# Patient Record
Sex: MALE | Race: WHITE | NOT HISPANIC OR LATINO | Employment: UNEMPLOYED | URBAN - METROPOLITAN AREA
[De-identification: names, ages, dates, MRNs, and addresses within clinical notes are randomized per-mention and may not be internally consistent; named-entity substitution may affect disease eponyms.]

---

## 2017-09-21 ENCOUNTER — HOSPITAL ENCOUNTER (INPATIENT)
Facility: HOSPITAL | Age: 57
LOS: 1 days | Discharge: HOME | DRG: 351 | End: 2017-09-23
Attending: EMERGENCY MEDICINE | Admitting: INTERNAL MEDICINE
Payer: MEDICARE

## 2017-09-21 DIAGNOSIS — M62.82 NON-TRAUMATIC RHABDOMYOLYSIS: ICD-10-CM

## 2017-09-21 DIAGNOSIS — R74.8 ELEVATED CK: Primary | ICD-10-CM

## 2017-09-21 DIAGNOSIS — R10.9 ABDOMINAL DISCOMFORT: ICD-10-CM

## 2017-09-21 DIAGNOSIS — N17.9 AKI (ACUTE KIDNEY INJURY) (HCC): ICD-10-CM

## 2017-09-21 DIAGNOSIS — R11.0 NAUSEA: ICD-10-CM

## 2017-09-21 LAB
BASOPHILS # BLD AUTO: 0.04 THOUSANDS/ΜL (ref 0–0.1)
BASOPHILS NFR BLD AUTO: 0 % (ref 0–1)
EOSINOPHIL # BLD AUTO: 0.05 THOUSAND/ΜL (ref 0–0.61)
EOSINOPHIL NFR BLD AUTO: 0 % (ref 0–6)
ERYTHROCYTE [DISTWIDTH] IN BLOOD BY AUTOMATED COUNT: 12.7 % (ref 11.6–15.1)
HCT VFR BLD AUTO: 46.2 % (ref 36.5–49.3)
HGB BLD-MCNC: 16.2 G/DL (ref 12–17)
LYMPHOCYTES # BLD AUTO: 2.64 THOUSANDS/ΜL (ref 0.6–4.47)
LYMPHOCYTES NFR BLD AUTO: 18 % (ref 14–44)
MCH RBC QN AUTO: 32.3 PG (ref 26.8–34.3)
MCHC RBC AUTO-ENTMCNC: 35.1 G/DL (ref 31.4–37.4)
MCV RBC AUTO: 92 FL (ref 82–98)
MONOCYTES # BLD AUTO: 1.45 THOUSAND/ΜL (ref 0.17–1.22)
MONOCYTES NFR BLD AUTO: 10 % (ref 4–12)
NEUTROPHILS # BLD AUTO: 10.58 THOUSANDS/ΜL (ref 1.85–7.62)
NEUTS SEG NFR BLD AUTO: 72 % (ref 43–75)
NRBC BLD AUTO-RTO: 0 /100 WBCS
PLATELET # BLD AUTO: 263 THOUSANDS/UL (ref 149–390)
PMV BLD AUTO: 11.1 FL (ref 8.9–12.7)
RBC # BLD AUTO: 5.02 MILLION/UL (ref 3.88–5.62)
TSH SERPL DL<=0.05 MIU/L-ACNC: 1.29 UIU/ML (ref 0.36–3.74)
WBC # BLD AUTO: 14.81 THOUSAND/UL (ref 4.31–10.16)

## 2017-09-21 PROCEDURE — 84443 ASSAY THYROID STIM HORMONE: CPT | Performed by: EMERGENCY MEDICINE

## 2017-09-21 PROCEDURE — 96361 HYDRATE IV INFUSION ADD-ON: CPT

## 2017-09-21 PROCEDURE — 81002 URINALYSIS NONAUTO W/O SCOPE: CPT | Performed by: EMERGENCY MEDICINE

## 2017-09-21 PROCEDURE — 85025 COMPLETE CBC W/AUTO DIFF WBC: CPT | Performed by: EMERGENCY MEDICINE

## 2017-09-21 PROCEDURE — 96374 THER/PROPH/DIAG INJ IV PUSH: CPT

## 2017-09-21 PROCEDURE — 82553 CREATINE MB FRACTION: CPT | Performed by: EMERGENCY MEDICINE

## 2017-09-21 PROCEDURE — 36415 COLL VENOUS BLD VENIPUNCTURE: CPT | Performed by: EMERGENCY MEDICINE

## 2017-09-21 PROCEDURE — 80053 COMPREHEN METABOLIC PANEL: CPT | Performed by: EMERGENCY MEDICINE

## 2017-09-21 PROCEDURE — 82550 ASSAY OF CK (CPK): CPT | Performed by: EMERGENCY MEDICINE

## 2017-09-21 RX ORDER — ONDANSETRON 2 MG/ML
4 INJECTION INTRAMUSCULAR; INTRAVENOUS ONCE
Status: COMPLETED | OUTPATIENT
Start: 2017-09-21 | End: 2017-09-21

## 2017-09-21 RX ADMIN — SODIUM CHLORIDE 1000 ML: 0.9 INJECTION, SOLUTION INTRAVENOUS at 22:35

## 2017-09-21 RX ADMIN — ONDANSETRON 4 MG: 2 INJECTION INTRAMUSCULAR; INTRAVENOUS at 23:22

## 2017-09-22 PROBLEM — N17.9 AKI (ACUTE KIDNEY INJURY) (HCC): Status: ACTIVE | Noted: 2017-09-22

## 2017-09-22 PROBLEM — M62.82 NON-TRAUMATIC RHABDOMYOLYSIS: Status: ACTIVE | Noted: 2017-09-22

## 2017-09-22 LAB
ALBUMIN SERPL BCP-MCNC: 4.8 G/DL (ref 3.5–5)
ALP SERPL-CCNC: 103 U/L (ref 46–116)
ALT SERPL W P-5'-P-CCNC: 59 U/L (ref 12–78)
ANION GAP SERPL CALCULATED.3IONS-SCNC: 12 MMOL/L (ref 4–13)
ANION GAP SERPL CALCULATED.3IONS-SCNC: 9 MMOL/L (ref 4–13)
AST SERPL W P-5'-P-CCNC: 83 U/L (ref 5–45)
ATRIAL RATE: 76 BPM
BACTERIA UR QL AUTO: ABNORMAL /HPF
BILIRUB SERPL-MCNC: 0.78 MG/DL (ref 0.2–1)
BILIRUB UR QL STRIP: NEGATIVE
BUN SERPL-MCNC: 30 MG/DL (ref 5–25)
BUN SERPL-MCNC: 34 MG/DL (ref 5–25)
CALCIUM SERPL-MCNC: 8.3 MG/DL (ref 8.3–10.1)
CALCIUM SERPL-MCNC: 9.9 MG/DL (ref 8.3–10.1)
CHLORIDE SERPL-SCNC: 106 MMOL/L (ref 100–108)
CHLORIDE SERPL-SCNC: 97 MMOL/L (ref 100–108)
CK MB SERPL-MCNC: 1 % (ref 0–2.5)
CK MB SERPL-MCNC: 29.2 NG/ML (ref 0–5)
CK MB SERPL-MCNC: 32.1 NG/ML (ref 0–5)
CK MB SERPL-MCNC: <1 % (ref 0–2.5)
CK SERPL-CCNC: 3097 U/L (ref 39–308)
CK SERPL-CCNC: 3335 U/L (ref 39–308)
CLARITY UR: ABNORMAL
CO2 SERPL-SCNC: 23 MMOL/L (ref 21–32)
CO2 SERPL-SCNC: 24 MMOL/L (ref 21–32)
COLOR UR: ABNORMAL
COLOR, POC: YELLOW
CREAT SERPL-MCNC: 1.65 MG/DL (ref 0.6–1.3)
CREAT SERPL-MCNC: 1.98 MG/DL (ref 0.6–1.3)
ERYTHROCYTE [DISTWIDTH] IN BLOOD BY AUTOMATED COUNT: 12.9 % (ref 11.6–15.1)
GFR SERPL CREATININE-BSD FRML MDRD: 37 ML/MIN/1.73SQ M
GFR SERPL CREATININE-BSD FRML MDRD: 46 ML/MIN/1.73SQ M
GLUCOSE SERPL-MCNC: 115 MG/DL (ref 65–140)
GLUCOSE SERPL-MCNC: 115 MG/DL (ref 65–140)
GLUCOSE UR STRIP-MCNC: NEGATIVE MG/DL
HCT VFR BLD AUTO: 41.9 % (ref 36.5–49.3)
HGB BLD-MCNC: 14.7 G/DL (ref 12–17)
HGB UR QL STRIP.AUTO: ABNORMAL
HYALINE CASTS #/AREA URNS LPF: ABNORMAL /LPF
KETONES UR STRIP-MCNC: NEGATIVE MG/DL
LEUKOCYTE ESTERASE UR QL STRIP: NEGATIVE
MAGNESIUM SERPL-MCNC: 2.8 MG/DL (ref 1.6–2.6)
MCH RBC QN AUTO: 32.4 PG (ref 26.8–34.3)
MCHC RBC AUTO-ENTMCNC: 35.1 G/DL (ref 31.4–37.4)
MCV RBC AUTO: 92 FL (ref 82–98)
NITRITE UR QL STRIP: NEGATIVE
NON-SQ EPI CELLS URNS QL MICRO: ABNORMAL /HPF
P AXIS: 65 DEGREES
PH UR STRIP.AUTO: 5.5 [PH] (ref 4.5–8)
PLATELET # BLD AUTO: 231 THOUSANDS/UL (ref 149–390)
PMV BLD AUTO: 10.8 FL (ref 8.9–12.7)
POTASSIUM SERPL-SCNC: 3.6 MMOL/L (ref 3.5–5.3)
POTASSIUM SERPL-SCNC: 3.6 MMOL/L (ref 3.5–5.3)
PR INTERVAL: 160 MS
PROT SERPL-MCNC: 9.2 G/DL (ref 6.4–8.2)
PROT UR STRIP-MCNC: ABNORMAL MG/DL
QRS AXIS: 86 DEGREES
QRSD INTERVAL: 94 MS
QT INTERVAL: 374 MS
QTC INTERVAL: 420 MS
RBC # BLD AUTO: 4.54 MILLION/UL (ref 3.88–5.62)
RBC #/AREA URNS AUTO: ABNORMAL /HPF
SODIUM SERPL-SCNC: 132 MMOL/L (ref 136–145)
SODIUM SERPL-SCNC: 139 MMOL/L (ref 136–145)
SP GR UR STRIP.AUTO: 1.02 (ref 1–1.03)
SPECIMEN SOURCE: NORMAL
T WAVE AXIS: 68 DEGREES
TROPONIN I BLD-MCNC: 0.01 NG/ML (ref 0–0.08)
UROBILINOGEN UR QL STRIP.AUTO: 0.2 E.U./DL
VENTRICULAR RATE: 76 BPM
WBC # BLD AUTO: 11.38 THOUSAND/UL (ref 4.31–10.16)
WBC #/AREA URNS AUTO: ABNORMAL /HPF

## 2017-09-22 PROCEDURE — 85027 COMPLETE CBC AUTOMATED: CPT | Performed by: INTERNAL MEDICINE

## 2017-09-22 PROCEDURE — 80048 BASIC METABOLIC PNL TOTAL CA: CPT | Performed by: INTERNAL MEDICINE

## 2017-09-22 PROCEDURE — 36415 COLL VENOUS BLD VENIPUNCTURE: CPT | Performed by: INTERNAL MEDICINE

## 2017-09-22 PROCEDURE — 82550 ASSAY OF CK (CPK): CPT | Performed by: INTERNAL MEDICINE

## 2017-09-22 PROCEDURE — 96361 HYDRATE IV INFUSION ADD-ON: CPT

## 2017-09-22 PROCEDURE — 93005 ELECTROCARDIOGRAM TRACING: CPT | Performed by: EMERGENCY MEDICINE

## 2017-09-22 PROCEDURE — 83735 ASSAY OF MAGNESIUM: CPT | Performed by: INTERNAL MEDICINE

## 2017-09-22 PROCEDURE — 82553 CREATINE MB FRACTION: CPT | Performed by: INTERNAL MEDICINE

## 2017-09-22 PROCEDURE — 99285 EMERGENCY DEPT VISIT HI MDM: CPT

## 2017-09-22 PROCEDURE — 84484 ASSAY OF TROPONIN QUANT: CPT

## 2017-09-22 PROCEDURE — 81001 URINALYSIS AUTO W/SCOPE: CPT

## 2017-09-22 RX ORDER — OXYCODONE HYDROCHLORIDE 5 MG/1
2.5 TABLET ORAL EVERY 4 HOURS PRN
Status: DISCONTINUED | OUTPATIENT
Start: 2017-09-22 | End: 2017-09-23 | Stop reason: HOSPADM

## 2017-09-22 RX ORDER — ALBUTEROL SULFATE 90 UG/1
2 AEROSOL, METERED RESPIRATORY (INHALATION) EVERY 4 HOURS PRN
Status: DISCONTINUED | OUTPATIENT
Start: 2017-09-22 | End: 2017-09-23 | Stop reason: HOSPADM

## 2017-09-22 RX ORDER — NICOTINE 21 MG/24HR
14 PATCH, TRANSDERMAL 24 HOURS TRANSDERMAL DAILY
Status: DISCONTINUED | OUTPATIENT
Start: 2017-09-22 | End: 2017-09-23 | Stop reason: HOSPADM

## 2017-09-22 RX ORDER — ACETAMINOPHEN 325 MG/1
650 TABLET ORAL EVERY 6 HOURS PRN
Status: DISCONTINUED | OUTPATIENT
Start: 2017-09-22 | End: 2017-09-23 | Stop reason: HOSPADM

## 2017-09-22 RX ORDER — SODIUM CHLORIDE 9 MG/ML
125 INJECTION, SOLUTION INTRAVENOUS CONTINUOUS
Status: DISCONTINUED | OUTPATIENT
Start: 2017-09-22 | End: 2017-09-22

## 2017-09-22 RX ORDER — SODIUM CHLORIDE 9 MG/ML
125 INJECTION, SOLUTION INTRAVENOUS CONTINUOUS
Status: DISPENSED | OUTPATIENT
Start: 2017-09-22 | End: 2017-09-23

## 2017-09-22 RX ADMIN — SODIUM CHLORIDE 125 ML/HR: 0.9 INJECTION, SOLUTION INTRAVENOUS at 10:12

## 2017-09-22 RX ADMIN — SODIUM CHLORIDE 125 ML/HR: 0.9 INJECTION, SOLUTION INTRAVENOUS at 23:25

## 2017-09-22 RX ADMIN — SODIUM CHLORIDE 1000 ML: 0.9 INJECTION, SOLUTION INTRAVENOUS at 00:12

## 2017-09-22 RX ADMIN — SODIUM CHLORIDE 125 ML/HR: 0.9 INJECTION, SOLUTION INTRAVENOUS at 07:20

## 2017-09-22 RX ADMIN — SODIUM CHLORIDE 125 ML/HR: 0.9 INJECTION, SOLUTION INTRAVENOUS at 03:50

## 2017-09-22 NOTE — PROGRESS NOTES
Post midnight follow up:     Elevated CK, with rhabdomyolysis from and over exertion while cutting tree:  - CK 3335 with  -mild increased from admission continue IV fluids for now  - troponin x1 0 01  - elevated creatinine 1 65 improvement from admission 1 98  - continue IV fluids  - WBC count improved from admission 11 38  - patient afebrile on to temperatures obtained since admission, blood pressure is stable+    AKA:  - related to rhabdo  - no history of prior baseline  - avoid nephrotoxins medications  - improving with IV fluid hydration  - continue IV fluids    Leukocytosis:  - improving since admission  - likely secondary to dehydration and rhabdo    Chest pain:  Transient  - troponin x1 negative  - EKG stable  - continue to monitor    Chronic diarrhea:  - patient reports chronic loose bowel movements for about 2 years  - should follow up with GI    GERD:   -  Patient does report significant history of reflux  - PPI initiated  - again outpatient GI follow-up    Nicotine abuse:   - patch  - pt reports slowing down but with cough and rhonchi as well as wheezing   - resp protocol     Subjective:  Patient reports slight improvement and severe leg cramping and hand cramping   - he still has pain and discomfort in all of his muscles  - he is with chronic diarrhea which I have instructed him to follow up with colonoscopy and outpatient follow-up with GI  - plan for potential discharge tomorrow after more 24 hours of IV fluids and trend down in CK

## 2017-09-22 NOTE — CASE MANAGEMENT
Initial Clinical Review    St  [facility] in the [facility] by Tony Wu for 2017  Network Utilization Review Department  Phone: [phone]; Fax [phone]  ATTENTION: The Network Utilization Review Department is now centralized for our 7 Facilities  Make a note that we have a new phone and fax numbers for our Department  Please call with any questions or concerns to [phone] and carefully follow the prompts so that you are directed to the right person  All voicemails are confidential  Fax any determinations, approvals, denials, and requests for initial or continue stay review clinical to [phone]  Due to HIGH CALL volume, it would be easier if you could please send faxed requests to expedite your requests and in part, help us provide discharge notifications faster  Admission: Date/Time/Statement: 9/22/17 @ 0108     Orders Placed This Encounter   Procedures    Inpatient Admission     Standing Status:   Standing     Number of Occurrences:   1     Order Specific Question:   Admitting Physician     Answer:   Zaid Payton [[id number]]     Order Specific Question:   Level of Care     Answer:   Med Surg [16]     Order Specific Question:   Estimated length of stay     Answer:   More than 2 Midnights     Order Specific Question:   Certification     Answer:   I certify that inpatient services are medically necessary for this patient for a duration of greater than two midnights  See H&P and MD Progress Notes for additional information about the patient's course of treatment           ED: Date/Time/Mode of Arrival:   ED Arrival Information     Expected Arrival Acuity Means of Arrival Escorted By Service Admission Type    - 9/21/2017 21:16 Emergent Walk-In Family Member General Medicine Emergency    Arrival Complaint    fatigue;no appetite;cramping          Chief Complaint:   Chief Complaint   Patient presents with    Heat Exposure     patient states "I was outside all day in the heat " Pt c/o nauseated and c/o cramping in bilateral legs and arms  History of Illness: Chelle Lewis is a 64 y o  male with a medical history significant for HCV not treated, cervicalgia, and thyroid nodule who presents with muscle cramps  He reports that he was outside cutting a tree for about 4 hours earlier today when he got really hot, had muscle cramps in his arms and legs  He reports drinking several bottles of gatorade and powerade  He also reports having 35secs of sharp left-sided chest pain on his way here  He does not have sob, no abdominal pain, vomiting, bloody/black stools, rashes, fevers, or dysuria   He does report having chronic diarrhea with soft stools for 2 years and also endorses esophageal reflux        ED Vital Signs:   ED Triage Vitals [09/21/17 2125]   Temperature Pulse Respirations Blood Pressure SpO2   97 5 °F (36 4 °C) 90 16 117/74 99 %      Temp Source Heart Rate Source Patient Position - Orthostatic VS BP Location FiO2 (%)   Oral Monitor Sitting Right arm --      Pain Score       6        Wt Readings from Last 1 Encounters:   09/22/17 75 kg (165 lb 5 5 oz)       Vital Signs (abnormal):   Date/Time  Temp  Pulse  Resp  BP  SpO2  O2 Device  Patient Position - Orthostatic VS   09/22/17 0730  97 6 °F (36 4 °C)  68  16  108/60  96 %  None (Room air)  Lying   09/22/17 0600  --  80  15  106/60  96 %  None (Room air)  Lying   09/22/17 0515  --  78  18  108/59  95 %  None (Room air)  Lying   09/22/17 0400  --  82  16  108/58  95 %  None (Room air)  Lying   09/22/17 0351  --  83  18  103/56  95 %  None (Room air)  Lying   09/22/17 0300  --  82  14  --  95 %  --  --   09/22/17 0253  --  --  --  --  98 %  None (Room air)  --   09/22/17 0200  --  86  18  116/73  96 %  None (Room air)  Lying   09/22/17 0046  --  81  18  140/83  98 %  None (Room air)  Lying   09/21/17 2341  --  86  18  122/68  98 %  --  --   09/21/17 2241  --  71  18  136/84  95 %  None (Room air)  Sitting 09/21/17 2125  97 5 °F (36 4 °C)  90  16  117/74  99 %  None (Room air)  Sitting         Abnormal Labs/Diagnostic Test Results:      Ref Range & Units 9/22/17 0408 9/21/17 2234   Sodium 136 - 145 mmol/L 139 132    Potassium 3 5 - 5 3 mmol/L 3 6 3 6   Chloride 100 - 108 mmol/L 106 97    CO2 21 - 32 mmol/L 24 23   Anion Gap 4 - 13 mmol/L 9 12   BUN 5 - 25 mg/dL 30  34    Creatinine 0 60 - 1 30 mg/dL 1 65  1 98CM    Glucose 65 - 140 mg/dL 115 115CM   Calcium 8 3 - 10 1 mg/dL 8 3 9 9   eGFR ml/min/1 73sq m 46 37              Ref Range & Units 9/22/17 0408 9/21/17 2234   WBC 4 31 - 10 16 Thousand/uL 11 38  14 81    RBC 3 88 - 5 62 Million/uL 4 54 5 02   Hemoglobin 12 0 - 17 0 g/dL 14 7 16 2   Hematocrit 36 5 - 49 3 % 41 9 46 2   MCV 82 - 98 fL 92 92   MCH 26 8 - 34 3 pg 32 4 32 3   MCHC 31 4 - 37 4 g/dL 35 1 35 1   RDW 11 6 - 15 1 % 12 9 12 7   Platelets 308 - 162 Thousands/uL 231 263   MPV 8 9 - 12 7 fL 10 8 11 1                  CPK  - 3097 ( 1 0/32 1 )  - 3335(<1 0/29 2 )       Urinalysis - Spec Grav 1 020 - Angelica - Cloudy - Protein 30 -     EKG - Nsr  - no acute  Ischemic changes   ED Treatment:   Medication Administration from 09/21/2017 2115 to 09/22/2017 1318       Date/Time Order Dose Route Action Action by Comments     09/21/2017 2235 sodium chloride 0 9 % bolus 1,000 mL 1,000 mL Intravenous [address] Taylor Tipton RN      09/21/2017 2322 ondansetron (ZOFRAN) injection 4 mg 4 mg Intravenous Given Denise Bhatti RN      09/22/2017 0012 sodium chloride 0 9 % bolus 1,000 mL 1,000 mL Intravenous [address] Arnaud Meza RN      09/22/2017 0350 sodium chloride 0 9 % infusion 125 mL/hr Intravenous New Bag Denise Bhatti RN           Past Medical/Surgical History:     Past Medical History:   Diagnosis Date    Hepatitis C     Thyroid nodule        Admitting Diagnosis: Heat exhaustion [T67  5XXA]  Nausea [R11 0]  Abdominal discomfort [R10 9]  Elevated CK [R74 8]  GAMALIEL (acute kidney injury) [N17 9]    Age/Sex: 64 y o  male    Assessment/Plan:   64 y o  male with a medical history significant for HCV not treated, cervicalgia, and thyroid nodule who presents with muscle cramps     # Elevated CK  - likely 2/2 rhabdomyolysis from overexertion while cutting the tree  Troponin is negative  - IVFs  - monitor for limb swelling  - recheck CK in am     # GAMALIEL  - likely related to rhabdomyolysis   No prior for baseline  - IVFs as above   - avoid nephrotoxins     # Leukocytosis  - no e/o infection, monitor     # Chest pain  - reported transient chest pain prior to admission  - tropnonin negative, EKG without acute findings  - monitor     # Chronic diarrhea  - reported loose stools x 2 years  - would discuss with GI for possible outpatient management     # GERD  - reported significant reflux and no prior tx  - start ppi       Admission Orders:  Scheduled Meds:    Continuous Infusions:   sodium chloride 125 mL/hr Last Rate: 125 mL/hr (09/22/17 0720)     PRN Meds:     Nursing Orders - VS q 4- up and OOB as tolerated - Venodynes to le's - Diet regular house -

## 2017-09-22 NOTE — ED NOTES
EKG completed at [facility] and signed by Dr Ksenia Sinclair on chart     Jeet Monte  09/22/17 012

## 2017-09-22 NOTE — ED ATTENDING ATTESTATION
Brandon Tenorio DO, saw and evaluated the patient  I have discussed the patient with the resident/non-physician practitioner and agree with the resident's/non-physician practitioner's findings, Plan of Care, and MDM as documented in the resident's/non-physician practitioner's note, except where noted  All available labs and Radiology studies were reviewed  At this point I agree with the current assessment done in the Emergency Department  I have conducted an independent evaluation of this patient including a focused history and a physical exam     ED Note - Vik Hood 64 y o  male MRN: [id number]  Unit/Bed#: ED 12 Encounter: [id number]    History of Present Illness   HPI  Vik Hood is a 64 y o  male who presents for evaluation of heat exposure, generalized muscle cramping, nausea after being outdoors today for at least 8 hours with sun exposure  States may be dehydrated  Ate some potato chips  No chest pain, dyspnea, flank pain  No edema or swelling  REVIEW OF SYSTEMS  See HPI for further details  12 systems reviewed and otherwise negative except as noted  Historical Information     PAST MEDICAL HISTORY  Past Medical History:   Diagnosis Date    Hepatitis C        FAMILY HISTORY  History reviewed  No pertinent family history  SOCIAL HISTORY  Social History     Social History    Marital status: Single     Spouse name: N/A    Number of children: N/A    Years of education: N/A     Social History Main Topics    Smoking status: Current Every Day Smoker    Smokeless tobacco: Never Used    Alcohol use No    Drug use: No    Sexual activity: Not Asked     Other Topics Concern    None     Social History Narrative    None       SURGICAL HISTORY  History reviewed  No pertinent surgical history    Meds/Allergies     CURRENT MEDICATIONS    Current Facility-Administered Medications:     ondansetron (ZOFRAN) injection 4 mg, 4 mg, Intravenous, Once, Eden Michel MD    sodium chloride 0 9 % bolus 1,000 mL, 1,000 mL, Intravenous, Once, Denis Nieves MD, Last Rate: 500 mL/hr at 09/21/17 2235, 1,000 mL at 09/21/17 2235  No current outpatient prescriptions on file  (Not in a hospital admission)    ALLERGIES  Allergies   Allergen Reactions    Penicillins      Objective     PHYSICAL EXAM    VITAL SIGNS: Blood pressure 136/84, pulse 71, temperature 97 5 °F (36 4 °C), temperature source Oral, resp  rate 18, height 5' 8" (1 727 m), weight 120 kg (265 lb), SpO2 95 %  Constitutional:  Well developed, well nourished, no acute distress, non-toxic appearance   Eyes:  PERRL, EOMI, conjunctivae and sclerae injected, sclerae non-icteric  HENT:  Normocephalic/Atraumatic, no rhinorrhea, mucous membranes moist   Neck: normal range of motion, no tenderness, supple   Respiratory:  No respiratory distress, normal breath sounds  Cardiovascular:  Normal rate, normal rhythm   GI:  Soft, non-tender, non-distended   :  No CVAT, no flank ecchymosis   Musculoskeletal:  No swelling or edema, no tenderness, no deformities  Integument:  Face and neck red- likely sunburn, Pink, warm, dry, Well hydrated, no rash, no erythema, no bullae   Lymphatic:  No cervical/ tonsillar/ submandibular lymphadenopathy noted   Neurologic:  Awake, Alert & oriented x 3, CN 2-12 intact, no focal neurological deficits  Psychiatric:  Speech and behavior appropriate       ED COURSE and MDM:    Assessment/Plan   Assessment:  63 yo male presents for evaluation of heat exposure, generalized muscle cramping, nausea after being outdoors today for at least 8 hours with sun exposure  States may be dehydrated  Ate some potato chips  No chest pain, dyspnea, flank pain  No edema or swelling  Plan:  Labs, IV fluids, symptom management, disposition as appropriate  Portions of the record may have been created with voice recognition software   Occasional wrong word or "sound a like" substitutions may have occurred due to the inherent limitations of voice recognition software       ED Provider  Electronically Signed by

## 2017-09-22 NOTE — PLAN OF CARE
Problem: PAIN - ADULT  Goal: Verbalizes/displays adequate comfort level or baseline comfort level  Interventions:  - Encourage patient to monitor pain and request assistance  - Assess pain using appropriate pain scale  - Administer analgesics based on type and severity of pain and evaluate response  - Implement non-pharmacological measures as appropriate and evaluate response  - Consider cultural and social influences on pain and pain management  - Notify physician/advanced practitioner if interventions unsuccessful or patient reports new pain  Outcome: Progressing      Problem: INFECTION - ADULT  Goal: Absence or prevention of progression during hospitalization  INTERVENTIONS:  - Assess and monitor for signs and symptoms of infection  - Monitor lab/diagnostic results  - Monitor all insertion sites, i e  indwelling lines, tubes, and drains  - Monitor endotracheal (as able) and nasal secretions for changes in amount and color  - [place] appropriate cooling/warming therapies per order  - Administer medications as ordered  - Instruct and encourage patient and family to use good hand hygiene technique  - Identify and instruct in appropriate isolation precautions for identified infection/condition  Outcome: Progressing    Goal: Absence of fever/infection during neutropenic period  INTERVENTIONS:  - Monitor WBC  - Implement neutropenic guidelines  Outcome: Progressing      Problem: SAFETY ADULT  Goal: Patient will remain free of falls  INTERVENTIONS:  - Assess patient frequently for physical needs  -  Identify cognitive and physical deficits and behaviors that affect risk of falls    -  [place] fall precautions as indicated by assessment   - Educate patient/family on patient safety including physical limitations  - Instruct patient to call for assistance with activity based on assessment  - Modify environment to reduce risk of injury  - Consider OT/PT consult to assist with strengthening/mobility  Outcome: Progressing    Goal: Maintain or return to baseline ADL function  INTERVENTIONS:  -  Assess patient's ability to carry out ADLs; assess patient's baseline for ADL function and identify physical deficits which impact ability to perform ADLs (bathing, care of mouth/teeth, toileting, grooming, dressing, etc )  - Assess/evaluate cause of self-care deficits   - Assess range of motion  - Assess patient's mobility; develop plan if impaired  - Assess patient's need for assistive devices and provide as appropriate  - Encourage maximum independence but intervene and supervise when necessary  ¯ Involve family in performance of ADLs  ¯ Assess for home care needs following discharge   ¯ Request OT consult to assist with ADL evaluation and planning for discharge  ¯ Provide patient education as appropriate  Outcome: Progressing    Goal: Maintain or return mobility status to optimal level  INTERVENTIONS:  - Assess patient's baseline mobility status (ambulation, transfers, stairs, etc )    - Identify cognitive and physical deficits and behaviors that affect mobility  - Identify mobility aids required to assist with transfers and/or ambulation (gait belt, sit-to-stand, lift, walker, cane, etc )  - [place] fall precautions as indicated by assessment  - Record patient progress and toleration of activity level on Mobility SBAR; progress patient to next Phase/Stage  - Instruct patient to call for assistance with activity based on assessment  - Request Rehabilitation consult to assist with strengthening/weightbearing, etc   Outcome: Progressing      Problem: DISCHARGE PLANNING  Goal: Discharge to home or other facility with appropriate resources  INTERVENTIONS:  - Identify barriers to discharge w/patient and caregiver  - Arrange for needed discharge resources and transportation as appropriate  - Identify discharge learning needs (meds, wound care, etc )  - Arrange for interpretive services to assist at discharge as needed  - Refer to Case Management Department for coordinating discharge planning if the patient needs post-hospital services based on physician/advanced practitioner order or complex needs related to functional status, cognitive ability, or social support system  Outcome: Progressing      Problem: Knowledge Deficit  Goal: Patient/family/caregiver demonstrates understanding of disease process, treatment plan, medications, and discharge instructions  Complete learning assessment and assess knowledge base    Interventions:  - Provide teaching at level of understanding  - Provide teaching via preferred learning methods  Outcome: Progressing

## 2017-09-22 NOTE — ED PROVIDER NOTES
History  Chief Complaint   Patient presents with    Heat Exposure     patient states "I was outside all day in the heat " Pt c/o nauseated and c/o cramping in bilateral legs and arms  This is a 77-year-old male with past medical history of hepatitis C which was recently diagnosed and thyroid nodule presents for evaluation of muscle cramping, abdominal discomfort, nausea after being outside tree-cutting for approximately 4 hours earlier today  Upon coming in from tree cutting he started developing extremity and groin cramping  It waxes and wanes but has been persistent since this afternoon  He feels as though he is dehydrated although he states he has been drinking some sort of fluids most of the day (powerade, vitamin water, etc )  He denies ever having this before  He relates no relieving or exacerbating factors  He also relates some nausea and severe acid reflux since this afternoon  He has not eaten much today, mainly fluid intake  He denies any vomiting, but was spitting up phlegm while I was present in the room  He denies any chest pain, SOB, lightheadedness or visual changes  None       Past Medical History:   Diagnosis Date    Hepatitis C     Thyroid nodule        History reviewed  No pertinent surgical history  History reviewed  No pertinent family history  I have reviewed and agree with the history as documented      Social History   Substance Use Topics    Smoking status: Current Every Day Smoker     Packs/day: 0 50    Smokeless tobacco: Never Used    Alcohol use No        Review of Systems    Physical Exam  ED Triage Vitals [09/21/17 2125]   Temperature Pulse Respirations Blood Pressure SpO2   97 5 °F (36 4 °C) 90 16 117/74 99 %      Temp Source Heart Rate Source Patient Position - Orthostatic VS BP Location FiO2 (%)   Oral Monitor Sitting Right arm --      Pain Score       6           Physical Exam    ED Medications  Medications   acetaminophen (TYLENOL) tablet 650 mg (not administered)   oxyCODONE (ROXICODONE) IR tablet 2 5 mg (not administered)   sodium chloride 0 9 % infusion (125 mL/hr Intravenous New Bag 9/22/17 1012)   nicotine (NICODERM CQ) 14 mg/24hr TD 24 hr patch 14 mg (0 mg Transdermal Hold 9/22/17 1116)   albuterol (PROVENTIL HFA,VENTOLIN HFA) inhaler 2 puff (not administered)   sodium chloride 0 9 % bolus 1,000 mL (0 mL Intravenous Stopped 9/21/17 2323)   ondansetron (ZOFRAN) injection 4 mg (4 mg Intravenous Given 9/21/17 2322)   sodium chloride 0 9 % bolus 1,000 mL (0 mL Intravenous Stopped 9/22/17 0340)       Diagnostic Studies  Labs Reviewed   COMPREHENSIVE METABOLIC PANEL - Abnormal        Result Value Ref Range Status    Sodium 132 (*) 136 - 145 mmol/L Final    Chloride 97 (*) 100 - 108 mmol/L Final    BUN 34 (*) 5 - 25 mg/dL Final    Creatinine 1 98 (*) 0 60 - 1 30 mg/dL Final    Comment: Standardized to IDMS reference method    AST 83 (*) 5 - 45 U/L Final    Comment:   Specimen collection should occur prior to Sulfasalazine administration due to the potential for falsely depressed results  Total Protein 9 2 (*) 6 4 - 8 2 g/dL Final    Potassium 3 6  3 5 - 5 3 mmol/L Final    CO2 23  21 - 32 mmol/L Final    Anion Gap 12  4 - 13 mmol/L Final    Glucose 115  65 - 140 mg/dL Final    Comment:   If the patient is fasting, the ADA then defines impaired fasting glucose as > 100 mg/dL and diabetes as > or equal to 123 mg/dL  Calcium 9 9  8 3 - 10 1 mg/dL Final    ALT 59  12 - 78 U/L Final    Comment:   Specimen collection should occur prior to Sulfasalazine and/or Sulfapyridine administration due to the potential for falsely depressed results       Alkaline Phosphatase 103  46 - 116 U/L Final    Albumin 4 8  3 5 - 5 0 g/dL Final    Total Bilirubin 0 78  0 20 - 1 00 mg/dL Final    eGFR 37  ml/min/1 73sq m Final    Narrative:     National Kidney Disease Education Program recommendations are as follows:  GFR calculation is accurate only with a steady state creatinine  Chronic Kidney disease less than 60 ml/min/1 73 sq  meters  Kidney failure less than 15 ml/min/1 73 sq  meters  CBC AND DIFFERENTIAL - Abnormal     WBC 14 81 (*) 4 31 - 10 16 Thousand/uL Final    Neutrophils Absolute 10 58 (*) 1 85 - 7 62 Thousands/µL Final    Monocytes Absolute 1 45 (*) 0 17 - 1 22 Thousand/µL Final    RBC 5 02  3 88 - 5 62 Million/uL Final    Hemoglobin 16 2  12 0 - 17 0 g/dL Final    Hematocrit 46 2  36 5 - 49 3 % Final    MCV 92  82 - 98 fL Final    MCH 32 3  26 8 - 34 3 pg Final    MCHC 35 1  31 4 - 37 4 g/dL Final    RDW 12 7  11 6 - 15 1 % Final    MPV 11 1  8 9 - 12 7 fL Final    Platelets 820  651 - 390 Thousands/uL Final    nRBC 0  /100 WBCs Final    Neutrophils Relative 72  43 - 75 % Final    Lymphocytes Relative 18  14 - 44 % Final    Monocytes Relative 10  4 - 12 % Final    Eosinophils Relative 0  0 - 6 % Final    Basophils Relative 0  0 - 1 % Final    Lymphocytes Absolute 2 64  0 60 - 4 47 Thousands/µL Final    Eosinophils Absolute 0 05  0 00 - 0 61 Thousand/µL Final    Basophils Absolute 0 04  0 00 - 0 10 Thousands/µL Final   CK - Abnormal     Total CK 3,097 (*) 39 - 308 U/L Final   CKMB - Abnormal     CK-MB FRACTION 32 1 (*) 0 0 - 5 0 ng/mL Final    CK-MB Index 1 0  0 0 - 2 5 % Final   URINE MICROSCOPIC - Abnormal     RBC, UA 2-4 (*) None Seen, 0-5 /hpf Final    Hyaline Casts, UA 10-25 (*) None Seen /lpf Final    WBC, UA None Seen  None Seen, 0-5, 5-55, 5-65 /hpf Final    Epithelial Cells Occasional  None Seen, Occasional /hpf Final    Bacteria, UA None Seen  None Seen, Occasional /hpf Final   CK - Abnormal     Total CK 3,335 (*) 39 - 308 U/L Final   BASIC METABOLIC PANEL - Abnormal     BUN 30 (*) 5 - 25 mg/dL Final    Creatinine 1 65 (*) 0 60 - 1 30 mg/dL Final    Comment: Standardized to IDMS reference method    Sodium 139  136 - 145 mmol/L Final    Potassium 3 6  3 5 - 5 3 mmol/L Final    Comment: Slightly Hemolyzed;  Results May be Affected    Chloride 106  100 - 108 mmol/L Final    CO2 24  21 - 32 mmol/L Final    Anion Gap 9  4 - 13 mmol/L Final    Glucose 115  65 - 140 mg/dL Final    Comment:   If the patient is fasting, the ADA then defines impaired fasting glucose as > 100 mg/dL and diabetes as > or equal to 123 mg/dL  Specimen collection should occur prior to Sulfasalazine administration due to the potential for falsely depressed results  Specimen collection should occur prior to Sulfapyridine administration due to the potential for falsely elevated results  Calcium 8 3  8 3 - 10 1 mg/dL Final    eGFR 46  ml/min/1 73sq m Final    Narrative:     National Kidney Disease Education Program recommendations are as follows:  GFR calculation is accurate only with a steady state creatinine  Chronic Kidney disease less than 60 ml/min/1 73 sq  meters  Kidney failure less than 15 ml/min/1 73 sq  meters  CBC AND PLATELET - Abnormal     WBC 11 38 (*) 4 31 - 10 16 Thousand/uL Final    RBC 4 54  3 88 - 5 62 Million/uL Final    Hemoglobin 14 7  12 0 - 17 0 g/dL Final    Hematocrit 41 9  36 5 - 49 3 % Final    MCV 92  82 - 98 fL Final    MCH 32 4  26 8 - 34 3 pg Final    MCHC 35 1  31 4 - 37 4 g/dL Final    RDW 12 9  11 6 - 15 1 % Final    Platelets 917  729 - 390 Thousands/uL Final    MPV 10 8  8 9 - 12 7 fL Final   MAGNESIUM - Abnormal     Magnesium 2 8 (*) 1 6 - 2 6 mg/dL Final    Comment: Slightly Hemolyzed;  Results May be Affected   CKMB - Abnormal     CK-MB FRACTION 29 2 (*) 0 0 - 5 0 ng/mL Final    CK-MB Index <1 0  0 0 - 2 5 % Final   ED URINE MACROSCOPIC - Abnormal     Protein, UA 30 (1+) (*) Negative mg/dl Final    Blood, UA Moderate (*) Negative Final    Color, UA Angelica   Final    Clarity, UA Cloudy   Final    pH, UA 5 5  4 5 - 8 0 Final    Leukocytes, UA Negative  Negative Final    Nitrite, UA Negative  Negative Final    Glucose, UA Negative  Negative mg/dl Final    Ketones, UA Negative  Negative mg/dl Final    Urobilinogen, UA 0 2  0 2, 1 0 E U /dl E U /dl Final Bilirubin, UA Negative  Negative Final    Specific Gravity, UA 1 020  1 003 - 1 030 Final    Narrative:     CLINITEK RESULT   TSH, 3RD GENERATION WITH T4 REFLEX - Normal    TSH 3RD GENERATON 1 290  0 358 - 3 740 uIU/mL Final    Narrative:     Patients undergoing fluorescein dye angiography may retain small amounts of fluorescein in the body for 48-72 hours post procedure  Samples containing fluorescein can produce falsely depressed TSH values  If the patient had this procedure,a specimen should be resubmitted post fluorescein clearance  POCT URINALYSIS DIPSTICK - Normal    Color, UA yellow   Final   POCT TROPONIN - Normal    POC Troponin I 0 01  0 00 - 0 08 ng/ml Final    Specimen Type VENOUS   Final    Narrative:     [employer] i-Stat handheld analyzer 99% cutoff is > 0 08ng/mL in network Emergency Departments    o cTnI 99% cutoff is useful only when applied to patients in the clinical setting of myocardial ischemia  o cTnI 99% cutoff should be interpreted in the context of clinical history, ECG findings and possibly cardiac imaging to establish correct diagnosis  o cTnI 99% cutoff may be suggestive but clearly not indicative of a coronary event without the clinical setting of myocardial ischemia  No orders to display       Procedures  Procedures      Phone Consults  ED Phone Contact    ED Course  ED Course as of Sep 22 1311   Fri Sep 22, 2017   0112 When I went over to discuss results with the patient he stated that he had on episode of left-sided chest tightness and felt that his diaphragm did something weird Vearl Rhody he was in the emergency department however he did not tell anybody that during his evaluation  He states that he has never had this kind of discomfort before  It was not associated with diaphoresis or nausea, it was nonexertional and is now resolved  He states that he is very overwhelmed by his diagnosis    Will add EKG and troponin to evaluate for possible cardiac etiology    0121 Procedure Note: EKG  Date/Time: 09/22/17 1:21 AM   Performed by: Anayeli Urrutia  Authorized by: Anayeli Urrutia  Indications / Diagnosis: CP  ECG reviewed by me, the ED Provider: yes   The EKG demonstrates:  Rhythm: normal sinus  Intervals: normal intervals  Axis: normal axis  QRS/Blocks: normal QRS  ST Changes: No acute ST Changes, no STD/BOB                                 MDM  CritCare Time    Disposition  Final diagnoses:   Elevated CK   GAMALIEL (acute kidney injury)   Nausea   Abdominal discomfort     ED Disposition     ED Disposition Condition Comment    Admit  Case was discussed with AMBER and the patient's admission status was agreed to be Admission Status: inpatient status to the service of Dr Claude Rajas   Follow-up Information    None       There are no discharge medications for this patient  No discharge procedures on file  ED Provider  Attending physically available and evaluated Jose Schmid I managed the patient along with the ED Attending      Electronically Signed by       Lynne Hoffman MD  Resident  09/22/17 8920

## 2017-09-22 NOTE — H&P
History and Physical - [facility] Internal Medicine    Patient Information: Afshin Brown 64 y o  male MRN: [id number]  Unit/Bed#: ED 12 Encounter: [id number]  Admitting Physician: Geremias Gallo MD  PCP: Martin Sellers MD  Date of Admission:  09/22/17      Chief Complaint: muscle cramps    History of Present Illness:   Afshin Brown is a 64 y o  male with a medical history significant for HCV not treated, cervicalgia, and thyroid nodule who presents with muscle cramps  He reports that he was outside cutting a tree for about 4 hours earlier today when he got really hot, had muscle cramps in his arms and legs  He reports drinking several bottles of gatorade and powerade  He also reports having 35secs of sharp left-sided chest pain on his way here  He does not have sob, no abdominal pain, vomiting, bloody/black stools, rashes, fevers, or dysuria  He does report having chronic diarrhea with soft stools for 2 years and also endorses esophageal reflux  In the ER, he was given zofran 4mg IV and 2L of NS    Past Medical  Past Medical History:   Diagnosis Date    Hepatitis C     Thyroid nodule        Past Surgical History:   History reviewed  No pertinent surgical history  Home Medications:   No meds    Allergies: Allergies   Allergen Reactions    Penicillins Rash       Social History:  Social History   Substance Use Topics    Smoking status: Current Every Day Smoker     Packs/day: 0 50    Smokeless tobacco: Never Used    Alcohol use No     History   Drug Use No       Family History:   History reviewed  No pertinent family history  Review of Systems:    All other review of systems reviewed and are negative except for as above in HPI  Physical Exam:    Temp: 97 5 °F (36 4 °C) (Oral)  HR:  [71-90] 80  Resp:  [14-18] 15  BP: (103-140)/(56-84) 106/60  SpO2:  [95 %-99 %] 96 %  Body mass index is 40 29 kg/m²    Height: 5' 8" (172 7 cm) Weight - Scale: 120 kg (265 lb)       Intake/Output Summary (Last 24 hours) at 17 0611  Last data filed at 17 2323   Gross per 24 hour   Intake             1000 ml   Output                0 ml   Net             1000 ml       General: Awake, alert, no acute distress  HEENT: NC/AT, EOMI, mmm  Neck: supple, no LAD  Lungs: CTA bl, no w/c/r  Heart[de-identified] regular, nl S1/S2, no appreciable murmurs  Abdomen: +BS, soft, NT/ND  Back: no spinal tenderness  Ext: no LE edema  Skin: several areas of escoriations  Neuro: 5/5 strength throughout except 4/5 in right arm (reportedly from h/o cervicalgia), nl sensation, O x 3    Labs:  Recent Labs      17   0408   NA  132*  139   K  3 6  3 6   CL  97*  106   CO2  23  24   BUN  34*  30*   CREATININE  1 98*  1 65*   CALCIUM  9 9  8 3   MG   --   2 8*       Recent Labs      17   0408   WBC  14 81*  11 38*   HGB  16 2  14 7   HCT  46 2  41 9   PLT  263  231   BASOPCT  0   --    EOSPCT  0   --    LYMPHSABS  2 64   --    MONOSABS  1 45*   --    EOSABS  0 05   --    BASOSABS  0 04   --      Recent Labs      17   223   ALT  59   AST  83*   ALKPHOS  103   BILITOT  0 78   ALBUMIN  4 8      No results for input(s): TROPONINI in the last 72 hours  No results found for: INR, PROTIME  Recent Labs      17   0019   KETONESU  Negative   SPECGRAV  1 020   PROTEINUA  30 (1+)*   UROBILINOGEN  0 2   LEUKOCYTESUR  Negative       I have personally reviewed pertinent reports  Imaging:      EKbpm, sinus, no acute ischemic changes    I have personally reviewed pertinent films in PACS    Assessment/Plan:   64 y o  male with a medical history significant for HCV not treated, cervicalgia, and thyroid nodule who presents with muscle cramps    # Elevated CK  - likely 2/2 rhabdomyolysis from overexertion while cutting the tree  Troponin is negative  - IVFs  - monitor for limb swelling  - recheck CK in am    # GAMALIEL  - likely related to rhabdomyolysis   No prior for baseline  - IVFs as above   - avoid nephrotoxins    # Leukocytosis  - no e/o infection, monitor    # Chest pain  - reported transient chest pain prior to admission  - tropnonin negative, EKG without acute findings  - monitor    # Chronic diarrhea  - reported loose stools x 2 years  - would discuss with GI for possible outpatient management    # GERD  - reported significant reflux and no prior tx  - start ppi    FEN: Diet Regular; Regular House  PPx:  low risk  / sequential compression device   Code: Level 1 - Full Code, as discussed at bedside on 9/22  Dispo: HLOC    I have discussed the plan of care with: patient, wife    Anticipated Length of Stay:  Patient will be admitted on an Inpatient basis with an anticipated length of stay of greater than 2 midnights     Justification for Hospital Stay: rhabdomyolysis, IV fluids    Allscripts / Epic Records Reviewed: Yes       Signature:  Philippe Jaquez MD     Electronic Signature

## 2017-09-22 NOTE — SOCIAL WORK
Pt new admit to the floor  CM met with patient and explained cm role  Pt alert and oriented  Pt reports he lives in a shelter in [place], was expelled from shelter and is working with his  to get back in, in meantime he and girlfriend Deedee Young are staying with her brother  Pt reports being independent PTA, he does not drive, and thinks he has transport for d/c  Denies DME, and VNA, previous outpt rehab not sure of agency  Pt's preferred Pharmacy is [id number] in [place], [place]  No POA  Reports prior hx with drugs, 2mths ago, etoh, 7 years ago, previous hx/admission for psych/mental health at facility in [facility], dx: PTSD, ADHD, Anxiety, and Depression  CM reviewed d/c planning process including the following: identifying help at home, patient preference for d/c planning needs, Discharge Lounge, [facility] Meds to Bed program, availability of treatment team to discuss questions or concerns patient and/or family may have regarding understanding medications and recognizing signs and symptoms once discharged  CM also encouraged patient to follow up with all recommended appointments after discharge  Patient advised of importance for patient and family to participate in managing patients medical well being

## 2017-09-22 NOTE — PLAN OF CARE
Problem: DISCHARGE PLANNING - CARE MANAGEMENT  Goal: Discharge to post-acute care or home with appropriate resources  INTERVENTIONS:  - Conduct assessment to determine patient/family and health care team treatment goals, and need for post-acute services based on payer coverage, community resources, and patient preferences, and barriers to discharge  - Address psychosocial, clinical, and financial barriers to discharge as identified in assessment in conjunction with the patient/family and health care team  - Arrange appropriate level of post-acute services according to patient's   needs and preference and payer coverage in collaboration with the physician and health care team  - Communicate with and update the patient/family, physician, and health care team regarding progress on the discharge plan  - Arrange appropriate transportation to post-acute venues  - Discharge to home/facility when medically cleared  Outcome: Progressing

## 2017-09-23 VITALS
BODY MASS INDEX: 25.06 KG/M2 | TEMPERATURE: 98 F | WEIGHT: 165.34 LBS | SYSTOLIC BLOOD PRESSURE: 125 MMHG | RESPIRATION RATE: 18 BRPM | HEART RATE: 72 BPM | OXYGEN SATURATION: 99 % | HEIGHT: 68 IN | DIASTOLIC BLOOD PRESSURE: 68 MMHG

## 2017-09-23 PROBLEM — N17.9 AKI (ACUTE KIDNEY INJURY) (HCC): Status: RESOLVED | Noted: 2017-09-22 | Resolved: 2017-09-23

## 2017-09-23 LAB
ALBUMIN SERPL BCP-MCNC: 2.8 G/DL (ref 3.5–5)
ALP SERPL-CCNC: 71 U/L (ref 46–116)
ALT SERPL W P-5'-P-CCNC: 37 U/L (ref 12–78)
ANION GAP SERPL CALCULATED.3IONS-SCNC: 7 MMOL/L (ref 4–13)
AST SERPL W P-5'-P-CCNC: 59 U/L (ref 5–45)
BILIRUB SERPL-MCNC: 0.35 MG/DL (ref 0.2–1)
BUN SERPL-MCNC: 20 MG/DL (ref 5–25)
CALCIUM SERPL-MCNC: 7.8 MG/DL (ref 8.3–10.1)
CHLORIDE SERPL-SCNC: 114 MMOL/L (ref 100–108)
CK MB SERPL-MCNC: 7.8 NG/ML (ref 0–5)
CK MB SERPL-MCNC: <1 % (ref 0–2.5)
CK SERPL-CCNC: 1681 U/L (ref 39–308)
CO2 SERPL-SCNC: 24 MMOL/L (ref 21–32)
CREAT SERPL-MCNC: 0.7 MG/DL (ref 0.6–1.3)
GFR SERPL CREATININE-BSD FRML MDRD: 105 ML/MIN/1.73SQ M
GLUCOSE SERPL-MCNC: 93 MG/DL (ref 65–140)
POTASSIUM SERPL-SCNC: 4.4 MMOL/L (ref 3.5–5.3)
PROT SERPL-MCNC: 6 G/DL (ref 6.4–8.2)
SODIUM SERPL-SCNC: 145 MMOL/L (ref 136–145)

## 2017-09-23 PROCEDURE — 82550 ASSAY OF CK (CPK): CPT | Performed by: NURSE PRACTITIONER

## 2017-09-23 PROCEDURE — 80053 COMPREHEN METABOLIC PANEL: CPT | Performed by: NURSE PRACTITIONER

## 2017-09-23 PROCEDURE — 82553 CREATINE MB FRACTION: CPT | Performed by: NURSE PRACTITIONER

## 2017-09-23 RX ORDER — NICOTINE 21 MG/24HR
1 PATCH, TRANSDERMAL 24 HOURS TRANSDERMAL DAILY
Qty: 7 PATCH | Refills: 0 | Status: SHIPPED | OUTPATIENT
Start: 2017-09-23

## 2017-09-23 RX ORDER — ALBUTEROL SULFATE 90 UG/1
2 AEROSOL, METERED RESPIRATORY (INHALATION) EVERY 4 HOURS PRN
Qty: 1 INHALER | Refills: 0 | Status: SHIPPED | OUTPATIENT
Start: 2017-09-23

## 2017-09-23 RX ADMIN — OXYCODONE HYDROCHLORIDE 2.5 MG: 5 TABLET ORAL at 12:21

## 2017-09-23 NOTE — DISCHARGE INSTRUCTIONS
Please stay well hydrated with water,   Please do not do any exercise or exertional activity for the next week until seen by your family doctor  Please have your blood work done in one week   Please do NOT take any ibuprofen/advil/ aleve/ NSAIDS or motrin      Acute Kidney Injury   WHAT YOU NEED TO KNOW:   Acute kidney injury (GAMALIEL) is also called acute kidney failure, or acute renal failure  GAMALIEL happens when your kidneys suddenly stop working correctly  Normally, the kidneys remove fluid, chemicals, and waste from your blood  These wastes are turned into urine by your kidneys  GAMALIEL usually happens over hours or days  When you have GAMALIEL, your kidneys do not remove the waste, chemicals, or extra fluid from your body  A normal amount of urine is not produced  GAMALIEL is usually temporary, it can take days to months to recover  GAMALIEL can also become a chronic kidney condition  DISCHARGE INSTRUCTIONS:   Call 911 if:   · You have sudden chest pain or trouble breathing  Seek care immediately if:   · Your symptoms get worse  Contact your healthcare provider if:   · Your symptoms return  · Your blood sugar or blood pressure level is not within the range your healthcare provider recommends  · You have questions or concerns about your condition or care  Nutrition:  Your healthcare provider may tell you to eat food low in sodium (salt), potassium, phosphorus, or protein  A dietitian can help you plan your meals  Drink liquids as directed: Your healthcare provider may recommend that you drink a certain amount of liquids  This will help your kidneys work better and decrease your risk for dehydration  Ask how much liquid to drink each day and which liquids are best for you  Prevent acute kidney injury:   · Manage other health conditions  such as diabetes, high blood pressure, or heart disease  These conditions increase your risk for acute kidney injury  Take your medicines for these conditions as directed   Also, monitor your blood sugar and blood pressure levels as directed  Contact your healthcare provider if your levels are not in the range he or she says it should be  · Talk to your healthcare provider before you take over-the-counter-medicine  NSAIDs, stomach medicine, or laxatives may harm your kidneys and increase your risk for acute kidney injury  If it is okay to take the medicine, follow the directions on the package  Do not take more than directed  · Tell healthcare providers you have had acute kidney injury  before you get contrast liquid for an x-ray or CT scan  Your healthcare provider may give you medicine to prevent kidney problems caused by the liquid  Follow up with your healthcare provider as directed: You will need to return for more tests to make sure your kidneys are working properly  You may also be referred to a kidney specialist  Write down your questions so you remember to ask them during your visits  © 2017 [employer]  Information is for End User's use only and may not be sold, redistributed or otherwise used for commercial purposes  All illustrations and images included in CareNotes® are the copyrighted property of [employer] M , Inc  or [employer]  The above information is an  only  It is not intended as medical advice for individual conditions or treatments  Talk to your doctor, nurse or pharmacist before following any medical regimen to see if it is safe and effective for you  Rhabdomyolysis   WHAT YOU NEED TO KNOW:   Rhabdomyolysis is a condition where injured muscles release harmful substances into the bloodstream  These substances include potassium, phosphate, creatinine kinase, and myoglobin  Large amounts of these substances may damage your kidneys and other organs  DISCHARGE INSTRUCTIONS:   Call 911 if:  · You have chest pain  · Your heart is beating faster than usual or has a strange rhythm    Seek care immediately if:   · Your urine is dark or tea-colored or has blood in it  · You have pain, swelling, or weakness in your arms or legs that does not go away or gets worse  · You are urinating less than usual or not able to urinate  Contact your healthcare provider if:   · You have questions or concerns about your condition or care  Follow up with your healthcare provider as directed: You may need to return to have blood tests done  Write down your questions so you remember to ask them during your visits  Self-care:   · Drink liquids as directed  Ask how much liquid to drink each day and which liquids are best for you  Drink more liquids if you are doing strenuous work, exercise, and if it is warm outside  Liquids help flush substances from your body  · Do not drink alcohol  Heavy alcohol use may increase your risk for rhabdomyolysis  © 2017 [employer] Information is for End User's use only and may not be sold, redistributed or otherwise used for commercial purposes  All illustrations and images included in CareNotes® are the copyrighted property of [employer] M [employer]  or [employer]  The above information is an  only  It is not intended as medical advice for individual conditions or treatments  Talk to your doctor, nurse or pharmacist before following any medical regimen to see if it is safe and effective for you

## 2017-09-23 NOTE — DISCHARGE SUMMARY
Discharge Summary - [facility]    Patient Information: Barrett Bower 64 y o  male MRN: [id number]  Unit/Bed#: [facility] 911-01 Encounter: [id number]    Discharging Physician / Practitioner: Wayne Hammond  PCP: Rafaela Martines MD  Admission Date: 9/21/2017  Discharge Date: 09/23/17    Reason for Admission:  Muscle cramps    Discharge Diagnoses:     Principal Problem:    Non-traumatic rhabdomyolysis  Active Problems:    GAMALIEL (acute kidney injury)  Resolved Problems:    * No resolved hospital problems  *      Consultations During Hospital Stay:  · None    Procedures Performed:     Creatinine kinase-MB fraction on admission 32 1 on day of discharge on 09/23/2017 is 7 8  Total CK on day of admission 3, 097 today 1681 ( 09/23/2017)  - creatinine on day of admission 1 98 on day of discharge 09/23/2017 creatinine is 0 70  - urine with moderate blood 30+ protein 2-4 RBCs and 10-25 hyaline casts  - EKG with normal sinus rhythm  - vital signs stable throughout admission, afebrile stable blood pressure    Significant Findings / Test Results:     · See above    Incidental Findings:   · None     Test Results Pending at Discharge (will require follow up): · None     Outpatient Tests Requested:  · Follow up with PCP, have blood work done this coming week    Complications:  None    Hospital Course:     Barrett Bower is a 64 y o  male patient who originally presented to the hospital on 9/21/2017 due to muscle cramps  Patient has a past medical history significant for HCV not treated, cervical a sugar, thyroid nodule presents with muscle cramping  Patient was reportedly outside 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 that he was out of shape  He reported that the day was really hard and his muscles in his arms and legs began to cramp  He started drinks several bottles of Gatorade and Powerade as well as water  Which did not seem to resolve    Patient reported that he had about a 35 second sharp left-sided chest pain sensation, and he also reports his chest caved in  Patient denied shortness of breath no abdominal pain, vomiting or bloody black stools, rashes fevers or dysuria  He does report that he has chronically loose stools has never really had this worked up as an outpatient  Patient reports on admission that he had sensation of some reflux, but states that this part is not chronic  In the emergency the patient was administered Zofran IV  Lab work revealed some mild rhabdomyolysis, resulting in elevated CK and GAMALIEL,  for which patient was treated with aggressive ivf hydration  Levels have improved  But still not normal pt will need to continue with fluid hydration via mouth and have repeat labs performed on 9/27/17, with results to pcp  Pt should, refrain from aggressive physical activity until cleared by his pcp and lab work reviewed  Condition at Discharge: fair     Discharge Day Visit / Exam:     Subjective:  Pt is doing well today still with some generalized muscles aches  Pt is very anxious and ready to go home  Discussed hydration and no nsaids or nsaid equivalents  No n/v/d no chest pain or palpitations   Vitals: Blood Pressure: 125/68 (09/23/17 0700)  Pulse: 72 (09/23/17 0700)  Temperature: 98 °F (36 7 °C) (09/23/17 0700)  Temp Source: Oral (09/23/17 0700)  Respirations: 18 (09/23/17 0700)  Height: 5' 8" (172 7 cm) (09/22/17 0730)  Weight - Scale: 75 kg (165 lb 5 5 oz) (09/22/17 0730)  SpO2: 100 % (09/23/17 0700)  Exam:   Physical Exam   Constitutional: He is oriented to person, place, and time  He appears well-developed  No distress  HENT:   Head: Normocephalic and atraumatic  Mouth/Throat: No oropharyngeal exudate  Eyes: Conjunctivae and EOM are normal  Pupils are equal, round, and reactive to light  Right eye exhibits no discharge  Left eye exhibits no discharge  No scleral icterus  Neck: No tracheal deviation present   No thyromegaly present  Cardiovascular: Normal rate and regular rhythm  Exam reveals no gallop and no friction rub  No murmur heard  Pulmonary/Chest: Effort normal and breath sounds normal  No stridor  No respiratory distress  He has no wheezes  He has no rales  He exhibits no tenderness  Abdominal: Soft  He exhibits no distension and no mass  There is no tenderness  There is no rebound and no guarding  Musculoskeletal: He exhibits no edema, tenderness or deformity  Lymphadenopathy:     He has no cervical adenopathy  Neurological: He is oriented to person, place, and time  Skin: Skin is warm  No rash noted  He is not diaphoretic  No erythema  No pallor  Psychiatric: He has a normal mood and affect  Discussion with Family: patient and his wife     Discharge instructions/Information to patient and family:   See after visit summary for information provided to patient and family  Provisions for Follow-Up Care:  See after visit summary for information related to follow-up care and any pertinent home health orders  Disposition:     Home    For Discharges to [facility]:   · Not Applicable to this Patient - Not Applicable to this Patient    Planned Readmission:      Discharge Statement:  I spent 45 minutes discharging the patient  This time was spent on the day of discharge  I had direct contact with the patient on the day of discharge  Greater than 50% of the total time was spent examining patient, answering all patient questions, arranging and discussing plan of care with patient as well as directly providing post-discharge instructions  Additional time then spent on discharge activities  Discharge Medications:  See after visit summary for reconciled discharge medications provided to patient and family        ** Please Note: This note has been constructed using a voice recognition system **

## 2017-09-27 NOTE — CASE MANAGEMENT
Tasia Dennison RN Utilization Manager Signed   Case Management Date of Service: 9/22/2017  8:59 AM         []Hide copied text  Initial Clinical Review       [facility] in the [facility] by Tony Wu for 2017  Network Utilization Review Department  Phone: [phone]; Fax [phone]  ATTENTION: The Network Utilization Review Department is now centralized for our 7 Facilities  Make a note that we have a new phone and fax numbers for our Department  Please call with any questions or concerns to [phone] and carefully follow the prompts so that you are directed to the right person  All voicemails are confidential  Fax any determinations, approvals, denials, and requests for initial or continue stay review clinical to [phone]  Due to HIGH CALL volume, it would be easier if you could please send faxed requests to expedite your requests and in part, help us provide discharge notifications faster      Admission: Date/Time/Statement: 9/22/17 @ 0108            Orders Placed This Encounter   Procedures    Inpatient Admission       Standing Status:   Standing       Number of Occurrences:   1       Order Specific Question:   Admitting Physician       Answer:   Nessa Rivera       Order Specific Question:   Level of Care       Answer:   Med Surg [16]       Order Specific Question:   Estimated length of stay       Answer:   More than 2 Midnights       Order Specific Question:   Certification       Answer:   I certify that inpatient services are medically necessary for this patient for a duration of greater than two midnights   See H&P and MD Progress Notes for additional information about the patient's course of treatment             ED: Date/Time/Mode of Arrival:             ED Arrival Information      Expected Arrival Acuity Means of Arrival Escorted By Service Admission Type     - 9/21/2017 21:16 Emergent Walk-In Family Member General Medicine Emergency   Arrival Complaint     fatigue;no appetite;cramping             Chief Complaint:        Chief Complaint   Patient presents with    Heat Exposure       patient states "I was outside all day in the heat " Pt c/o nauseated and c/o cramping in bilateral legs and arms          History of Illness: Marylu Roman a 64 y  o  male with a medical history significant for HCV not treated, cervicalgia, and thyroid nodule who presents with muscle cramps  He reports that he was outside cutting a tree for about 4 hours earlier today when he got really hot, had muscle cramps in his arms and legs  He reports drinking several bottles of gatorade and powerade  He also reports having 35secs of sharp left-sided chest pain on his way here  He does not have sob, no abdominal pain, vomiting, bloody/black stools, rashes, fevers, or dysuria   He does report having chronic diarrhea with soft stools for 2 years and also endorses esophageal reflux         ED Vital Signs:            ED Triage Vitals [09/21/17 2125]   Temperature Pulse Respirations Blood Pressure SpO2   97 5 °F (36 4 °C) 90 16 117/74 99 %       Temp Source Heart Rate Source Patient Position - Orthostatic VS BP Location FiO2 (%)   Oral Monitor Sitting Right arm --       Pain Score           6                Wt Readings from Last 1 Encounters:   09/22/17 75 kg (165 lb 5 5 oz)         Vital Signs (abnormal):   Date/Time   Temp   Pulse   Resp   BP   SpO2   O2 Device   Patient Position - Orthostatic VS   09/22/17 0730   97 6 °F (36 4 °C)   68   16   108/60   96 %   None (Room air)   Lying   09/22/17 0600   --   80   15   106/60   96 %   None (Room air)   Lying   09/22/17 0515   --   78   18   108/59   95 %   None (Room air)   Lying   09/22/17 0400   --   82   16   108/58   95 %   None (Room air)   Lying   09/22/17 0351   --   83   18   103/56   95 %   None (Room air)   Lying   09/22/17 0300   --   82   14   --   95 %   --   --   09/22/17 0253   --   --   --   --   98 %   None (Room air)   --   09/22/17 0200   --   86   18   116/73   96 %   None (Room air)   Lying   09/22/17 0046   --   81   18   140/83   98 %   None (Room air)   Lying   09/21/17 2341   --   86   18   122/68   98 %   --   --   09/21/17 2241   --   71   18   136/84   95 %   None (Room air)   Sitting   09/21/17 2125   97 5 °F (36 4 °C)   90   16   117/74   99 %   None (Room air)   Sitting            Abnormal Labs/Diagnostic Test Results:        Ref Range & Units 9/22/17 0408 9/21/17 2234   Sodium 136 - 145 mmol/L 139 132    Potassium 3 5 - 5 3 mmol/L 3 6 3 6   Chloride 100 - 108 mmol/L 106 97    CO2 21 - 32 mmol/L 24 23   Anion Gap 4 - 13 mmol/L 9 12   BUN 5 - 25 mg/dL 30  34    Creatinine 0 60 - 1 30 mg/dL 1 65  1 98CM    Glucose 65 - 140 mg/dL 115 115CM   Calcium 8 3 - 10 1 mg/dL 8 3 9 9   eGFR ml/min/1 73sq m 46 37                 Ref Range & Units 9/22/17 0408 9/21/17 2234   WBC 4 31 - 10 16 Thousand/uL 11 38  14 81    RBC 3 88 - 5 62 Million/uL 4 54 5 02   Hemoglobin 12 0 - 17 0 g/dL 14 7 16 2   Hematocrit 36 5 - 49 3 % 41 9 46 2   MCV 82 - 98 fL 92 92   MCH 26 8 - 34 3 pg 32 4 32 3   MCHC 31 4 - 37 4 g/dL 35 1 35 1   RDW 11 6 - 15 1 % 12 9 12 7   Platelets 238 - 167 Thousands/uL 231 263   MPV 8 9 - 12 7 fL 10 8 11 1                      CPK  - 3097 ( 1 0/32 1 )  - 3335(<1 0/29 2 )         Urinalysis - Spec Grav 1 020 - Angelica - Cloudy - Protein 30 -      EKG - Nsr  - no acute  Ischemic changes   ED Treatment:              Medication Administration from 09/21/2017 2115 to 09/22/2017 5046        Date/Time Order Dose Route Action Action by Comments       09/21/2017 2235 sodium chloride 0 9 % bolus 1,000 mL 1,000 mL Intravenous New Saul Arteaga RN         09/21/2017 2322 ondansetron (ZOFRAN) injection 4 mg 4 mg Intravenous Given Ron Britton, KRISTIAN         09/22/2017 0012 sodium chloride 0 9 % bolus 1,000 mL 1,000 mL Intravenous New Bag Annika Azul RN         09/22/2017 0350 sodium chloride 0 9 % infusion 125 mL/hr Intravenous New Bag Jamaica Villa RN               Past Medical/Surgical History:           Past Medical History:   Diagnosis Date    Hepatitis C      Thyroid nodule           Admitting Diagnosis: Heat exhaustion [T67  5XXA]  Nausea [R11 0]  Abdominal discomfort [R10 9]  Elevated CK [R74 8]  GAMALIEL (acute kidney injury) [N17 9]     Age/Sex: 64 y o  male     Assessment/Plan:   64 y  o  male with a medical history significant for HCV not treated, cervicalgia, and thyroid nodule who presents with muscle cramps     # Elevated CK  - likely 2/2 rhabdomyolysis from overexertion while cutting the tree  Troponin is negative  - IVFs  - monitor for limb swelling  - recheck CK in am     # GAMALIEL  - likely related to rhabdomyolysis  No prior for baseline  - IVFs as above   - avoid nephrotoxins     # Leukocytosis  - no e/o infection, monitor     # Chest pain  - reported transient chest pain prior to admission  - tropnonin negative, EKG without acute findings  - monitor     # Chronic diarrhea  - reported loose stools x 2 years  - would discuss with GI for possible outpatient management     # GERD  - reported significant reflux and no prior tx  - start ppi        Admission Orders:  Scheduled Meds:    Continuous Infusions:   sodium chloride 125 mL/hr Last Rate: 125 mL/hr (09/22/17 0720)      PRN Meds:      Nursing Orders - VS q 4- up and OOB as tolerated - Venodynes to le's - Diet regular house -            Hello,    We are still waiting on a determination for this admission  Please call or fax in the pre-certification/authorization number with determination so that we can close this case on our end  Your assistance is appreciated  If you have any questions, please call the UR Department  Barbara Christian  Utilization     [address] [facility] in the [facility] by Tony Wu for 2017  Network Utilization Review Department  Phone: [phone];  Fax [phone]  ATTENTION: The Network Utilization Review Department is now centralized for our 7 Facilities  Make a note that we have a new phone and fax numbers for our Department  Please call with any questions or concerns to [phone] and carefully follow the prompts so that you are directed to the right person  All voicemails are confidential  Fax any determinations, approvals, denials, and requests for initial or continue stay review clinical to [phone]  Due to HIGH CALL volume, it would be easier if you could please send faxed requests to expedite your requests and in part, help us provide discharge notifications faster